# Patient Record
Sex: MALE | Race: BLACK OR AFRICAN AMERICAN | NOT HISPANIC OR LATINO | URBAN - METROPOLITAN AREA
[De-identification: names, ages, dates, MRNs, and addresses within clinical notes are randomized per-mention and may not be internally consistent; named-entity substitution may affect disease eponyms.]

---
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
## 2019-07-18 NOTE — ED ADULT NURSE NOTE - CADM POA CENTRAL LINE
test.  · Diabetes. Ask your doctor whether you should have tests for diabetes. · Colon cancer. Your risk for colorectal cancer gets higher as you get older. Some experts say that adults should start regular screening at age 48 and stop at age 76. Others say to start before age 48 or continue after age 76. Talk with your doctor about your risk and when to start and stop screening. For women  · Breast exam and mammogram. Talk to your doctor about when you should have a clinical breast exam and a mammogram. Medical experts differ on whether and how often women under 50 should have these tests. Your doctor can help you decide what is right for you. · Cervical cancer screening test and pelvic exam. Begin with a Pap test at age 24. The test often is part of a pelvic exam. Starting at age 27, you may choose to have a Pap test, an HPV test, or both tests at the same time (called co-testing). Talk with your doctor about how often to have testing. · Tests for sexually transmitted infections (STIs). Ask whether you should have tests for STIs. You may be at risk if you have sex with more than one person, especially if your partners do not wear condoms. For men  · Tests for sexually transmitted infections (STIs). Ask whether you should have tests for STIs. You may be at risk if you have sex with more than one person, especially if you do not wear a condom. · Testicular cancer exam. Ask your doctor whether you should check your testicles regularly. · Prostate exam. Talk to your doctor about whether you should have a blood test (called a PSA test) for prostate cancer. Experts differ on whether and when men should have this test. Some experts suggest it if you are older than 39 and are -American or have a father or brother who got prostate cancer when he was younger than 72. When should you call for help?   Watch closely for changes in your health, and be sure to contact your doctor if you have any problems or your child's skin. · Prop up the sore foot on a pillow when you ice it or anytime your child sits or lies down during the next 3 days. Try to keep it above the level of your child's heart. This will help reduce swelling. · Your doctor may recommend that you wrap your child's foot with an elastic bandage. Keep the foot wrapped for as long as your doctor advises. · If your doctor recommends crutches, help your child use them as directed. · Have your child wear roomy footwear. · As soon as pain and swelling end, have your child begin gentle foot exercises. Your doctor can tell you which exercises will help. When should you call for help? Call 911 anytime you think your child may need emergency care. For example, call if:    · Your child's foot turns pale, white, blue, or cold.    Call your doctor now or seek immediate medical care if:    · Your child cannot move or stand on his or her foot.     · Your child's foot looks twisted or out of its normal position.     · Your child's foot is not stable when he or she steps down.     · Your child has signs of infection, such as:  ? Increased pain, swelling, warmth, or redness. ? Red streaks leading from the sore area. ? Pus draining from a place on the foot. ? A fever.     · Your child's foot is numb or tingly.    Watch closely for changes in your child's health, and be sure to contact your doctor if:    · Your child does not get better as expected.     · Your child has bruises from an injury that last longer than 2 weeks. Where can you learn more? Go to https://SongdropnigelStitch.es.Chefs Feed. org and sign in to your Netrepid account. Enter D134 in the Rysto box to learn more about \"Foot Pain in Children: Care Instructions. \"     If you do not have an account, please click on the \"Sign Up Now\" link. Current as of: September 20, 2018  Content Version: 12.0  © 4255-6948 Healthwise, Incorporated. Care instructions adapted under license by [facility] ([facility]). sensitivity to bright light or loud noise. List anything that might have triggered the headache. When you know what things trigger your child's headaches, try to avoid them. · Make sure that your child drinks 4 to 8 glasses of fluid a day. Avoid drinks that have caffeine. Many popular soda drinks contain caffeine. · Make sure that your child gets plenty of sleep. Most children need to sleep 8 to 10 hours each night. · Encourage your child to get plenty of exercise. · Make sure that your child does not skip meals. Provide regular, healthy meals. · Keep your child away from smoke. Do not smoke or let anyone else smoke around your child or in your house. · Find healthy ways to deal with stress. Do not overbook your child's time. · Seek help if you think your child may be depressed or anxious. Treating these problems may reduce the number of migraines your child has. · Limit the amount of time your child spends in front of the TV and computer. When should you call for help? Call your doctor now or seek immediate medical care if:    · Your child has a very painful, sudden headache that is unlike any he or she has had before.     · Your child has a fever with a stiff neck.     · Your child has a headache with sudden weakness, numbness, inability to move parts of his or her body, visual problems, slurred speech, confusion, or behavior changes.     · Your child has headaches after a recent fall or blow to the head.    Watch closely for changes in your child's health, and be sure to contact your doctor if:    · Your child's headaches become more painful or frequent.     · Your child's headache does not go away as expected. Where can you learn more? Go to https://olga.TheMobileGamer (TMG). org and sign in to your Razient account. Enter J120 in the Uni-Pixel box to learn more about \"Migraine Headaches in Children: Care Instructions. \"     If you do not have an account, please click on the \"Sign Up No

## 2020-06-29 NOTE — ED BEHAVIORAL HEALTH ASSESSMENT NOTE - NS ED BHA ED COURSE FOUR POINT RESTRAINTS IN ED YN
Called and spoke with patient to let her know some mobic was sent to the pharmacy for her. Patient verbalized understanding.   Yes

## 2020-09-01 NOTE — ED BEHAVIORAL HEALTH ASSESSMENT NOTE - AFFECT RANGE
COVID Screen    Does Patient OR patient's household members have any of the following:    · Temperature: Fever greater than or equal to 100 F   No   · Respiratory symptoms: New or worsening cough, shortness of breath, sore throat, congestion or runny nose   No   · GI Symptoms: New onset of nausea, vomiting or diarrhea   No   · Miscellaneous: New onset of loss of taste or smell, chills, repeated shaking with chills, muscle pain or headache   No   · In the last 14 days,  have you or anyone in your household tested positive, suspected of having or have a test in process for covid?   No   · In the last 14 days, have you or anyone in your household had any contact with anyone who has tested positive, suspected of having, or have a test in process for covid?   No     If patient is scheduled for a non virtual appointment (in clinic):  • Patient informed of policy for masking while present for appointments and asked to bring own mask if able to and/or told a mask will be given at arrival to clinic   Yes  • Patient informed of visitor restrictions (if a visitor/chaperone is necessary please list name of person)   Yes  • Patient advised not to arrive more than 20 minutes before appointment time   Yes     Constricted